# Patient Record
Sex: FEMALE | Race: WHITE | NOT HISPANIC OR LATINO | Employment: FULL TIME | URBAN - METROPOLITAN AREA
[De-identification: names, ages, dates, MRNs, and addresses within clinical notes are randomized per-mention and may not be internally consistent; named-entity substitution may affect disease eponyms.]

---

## 2017-12-18 ENCOUNTER — APPOINTMENT (OUTPATIENT)
Dept: LAB | Facility: HOSPITAL | Age: 37
End: 2017-12-18

## 2018-04-11 ENCOUNTER — OFFICE VISIT CONVERTED (OUTPATIENT)
Dept: FAMILY MEDICINE CLINIC | Age: 38
End: 2018-04-11
Attending: NURSE PRACTITIONER

## 2018-10-10 ENCOUNTER — OFFICE VISIT CONVERTED (OUTPATIENT)
Dept: FAMILY MEDICINE CLINIC | Age: 38
End: 2018-10-10
Attending: NURSE PRACTITIONER

## 2019-11-14 ENCOUNTER — OFFICE VISIT CONVERTED (OUTPATIENT)
Dept: FAMILY MEDICINE CLINIC | Age: 39
End: 2019-11-14
Attending: NURSE PRACTITIONER

## 2020-01-06 ENCOUNTER — HOSPITAL ENCOUNTER (OUTPATIENT)
Dept: OTHER | Facility: HOSPITAL | Age: 40
Discharge: HOME | End: 2020-01-06
Attending: INTERNAL MEDICINE

## 2020-07-24 ENCOUNTER — OFFICE VISIT CONVERTED (OUTPATIENT)
Dept: FAMILY MEDICINE CLINIC | Age: 40
End: 2020-07-24
Attending: NURSE PRACTITIONER

## 2020-07-24 ENCOUNTER — HOSPITAL ENCOUNTER (OUTPATIENT)
Dept: OTHER | Facility: HOSPITAL | Age: 40
Discharge: HOME | End: 2020-07-24
Attending: NURSE PRACTITIONER

## 2020-07-28 LAB — SARS-COV-2 RNA SPEC QL NAA+PROBE: NOT DETECTED

## 2021-05-18 NOTE — PROGRESS NOTES
Kiera Murphy 1980     Office/Outpatient Visit    Visit Date: Wed, Oct 10, 2018 03:58 pm    Provider: Sondra Phillips N.P. (Assistant: Loren Martins MA)    Location: [facility]        Electronically signed by Sondra Phillips N.P. on  10/11/2018 08:24:47 AM                             SUBJECTIVE:        CC:     Mrs. Murphy is a 38 year old White female.  presents today due to complaints of blood in urine X 2 weeks, also is here for review on scans from [place] and [facility]         HPI: Kiera reports history of thyroid nodule. This was shown on US last year. Saw ENT Dr. Mayorga last year and was to have follow up but had trouble scheduling with office so would like to be referred to another ENT. She thinks the nodule may have gotten bigger since last year.    She also reports blood in her urine. Describes as bright red blood. Has had recurrent episodes of blood in urine with previous flank pain and abdominal pain. She was seen at [facility] ER 12/2017. CT scan was normal except for fatty liver. She also had CT scan at [facility] 7/24/18 with advanced hepatic steatosis. No renal abnormalities. Was recommended to follow up with PCP at that time. She notes that she has had blood in her urine again for the past 1-2 weeks intermittently. She notes family history of kidney problems with one having kidney transplant. Also family members recently diagnosed with lupus.             ROS:     CONSTITUTIONAL:  Negative for chills and fever.      CARDIOVASCULAR:  Negative for chest pain and palpitations.      RESPIRATORY:  Negative for dyspnea and frequent wheezing.      GASTROINTESTINAL:  Positive for abdominal pain ( previously, none currently ).   Negative for constipation or diarrhea.      GENITOURINARY:  Positive for hematuria.   Negative for dysuria or frequent urination.      NEUROLOGICAL:  Negative for dizziness and headaches.      ENDOCRINE:  Positive for prediabetes, no longer on medication and  thyroid nodule.      PSYCHIATRIC:  Negative for depression and suicidal thoughts.          PMH/FMH/SH:     Last Reviewed on 10/10/2018 04:21 PM by Sondra Phillips    Past Medical History:                 PAST MEDICAL HISTORY         Pre-diabetes     Migraine Headaches     Rosacea         GYNECOLOGICAL HISTORY:             PREVENTIVE HEALTH MAINTENANCE             MAMMOGRAM: declines, understands reason for testing has never been done Maternal grandmother with breast cancer     PAP SMEAR: was last done  with normal results         PAST MEDICAL HISTORY             CURRENT MEDICAL PROVIDERS:    Dermatologist: Dr. Murray    E/N/T: (Regarding Thyroid nodule 3/17)         Surgical History:         Positive for CT BRAIN , NEG.;     removed foreign object from right  knee;    wisdom teeth surgery;     Cholecystectomy: ;         Family History:         Positive for Coronary Artery Disease and Hypertension.      Positive for Breast Cancer.      Positive for Asthma.      Positive for Renal Failure.      Positive for Type 2 Diabetes and Hypothyroidism.  Father: Hypertension;  Fibromyalgia; Osteoarthritis     Mother: Osteoarthritis     Brother(s): 3 brother(s) total;  Fibromyalgia; Osteoarthritis     Sister(s): Healthy; 1 sister(s) total         Social History:     Occupation: [employer]     Marital Status:      Children: None         Tobacco/Alcohol/Supplements:     Last Reviewed on 10/10/2018 04:21 PM by Sondra Phillips    Tobacco: She has a past history of cigarette smoking; quit date:  .          Alcohol: Frequency: Socially     Caffeine:  She admits to consuming caffeine via coffee ( 2 servings per day ) and tea ( 1 serving per day ).          Substance Abuse History:     Last Reviewed on 10/10/2018 04:21 PM by Sondra Phillips    None         Mental Health History:     Last Reviewed on 10/10/2018 04:21 PM by Sondra Phillips    NEGATIVE         Communicable Diseases (eg STDs):     Last Reviewed on 10/10/2018 04:21  PM by Sondra Phillips    Reportable health conditions; NEGATIVE             Current Problems:     Last Reviewed on 10/10/2018 04:21 PM by Sondra Phillips    Sinus tachycardia     Abnormal glucose, Other     Thyroid nodule     Morbid obesity     Acne rosacea     Obstructive sleep apnea     Allergies     Common migraine         Immunizations:     Adacel (Tdap) 3/29/2016         Allergies:     Last Reviewed on 10/10/2018 04:21 PM by Sondra Phillips    Keflex:        Current Medications:     Last Reviewed on 10/10/2018 04:21 PM by Sondra Phillips    Albuterol HFA 90mcg/1actuation Oral Inhaler 2 puff  QID  PRN     Singulair  10mg Tablet 1 tab daily     Lancet   Lancet Check blood once daily as directed w/Contour Next Diag R73.09     Loratadine 10mg Tablet 1 tab daily     Contour Next Test Strips  Reagent Strips check blood sugar once daily  DX: R73.09     Doxycycline  50mg Capsules Take 1 capsule(s) by mouth bid         OBJECTIVE:        Vitals:         Current: 10/10/2018 4:04:06 PM    Ht:  5 ft, 4 in;  Wt: 461.8 lbs;  BMI: 79.3    T: 98.3 F (oral);  BP: 130/59 mm Hg (left arm, sitting);  P: 119 bpm (finger clip, sitting);  sCr: 0.78 mg/dL;  GFR: 158.62        Exams:     PHYSICAL EXAM:     GENERAL: well developed, well nourished, obese;  no apparent distress;     NECK: range of motion is normal; trachea is midline;     RESPIRATORY: normal respiratory rate and pattern with no distress; normal breath sounds with no rales, rhonchi, wheezes or rubs;     CARDIOVASCULAR: normal rate; rhythm is regular;     GASTROINTESTINAL: nontender; normal bowel sounds; no organomegaly;     NEUROLOGIC: mental status: alert and oriented x 3; GROSSLY INTACT     PSYCHIATRIC: appropriate affect and demeanor;         Lab/Test Results:             Glucose, Urine:  Neg (10/10/2018),     Bilirubin, urine:  Negative (10/10/2018),     Ketones, Urine Strip:  Negative (10/10/2018),     Specific Gravity, urine:  1.030 (10/10/2018),     Blood in Urine:  small  (10/10/2018),     pH, urine:  6.0 (10/10/2018),     Protein Urine QL:  30 mg (10/10/2018),     Urobilinogen, urine:  1.0 E.U./dL (10/10/2018),     Nitrite, Urine:  Negative (10/10/2018),     Leukoctyes, urine:  Negative (10/10/2018),     Appearance:  Clear (10/10/2018),     collection source:  Clean-catch (10/10/2018),     Color:  Yellow (10/10/2018),     Performed by::  AS (10/10/2018),             ASSESSMENT           599.70   R31.9  Hematuria, unspecified              DDx:     571.8   K75.81  Nonalcoholic fatty liver              DDx:     V77.91   Z13.220  Screening for cholesterol level              DDx:     241.0   E07.9  Thyroid nodule              DDx:     719.48   M25.50  Joint pain, other specified site              DDx:     278.01   E66.01  Morbid obesity              DDx:         ORDERS:         Radiology/Test Orders:       [id number]  US, soft tissues of head & neck (eg, thyroid, parathyroid, parotid), real time w/image documentation  (Send-Out)           Lab Orders:       [id number]  BDCB2 - [facility] CBC w/o diff  (Send-Out)         98642  COMP - [facility] Comp. Metabolic Panel  (Send-Out)         30482  LPDP - [facility] Lipid Panel  (Send-Out)         43285  TSH - [facility] TSH  (Send-Out)         85023  RAPII - [facility] Arthritis Profile  (Send-Out)         91704  Urinalysis, automated, without microscopy  (In-House)           Procedures Ordered:       REFER  Referral to Specialist or Other Facility  (Send-Out)           Other Orders:         Calculated BMI above the upper parameter and a follow-up plan was documented in the medical record  (In-House)                   PLAN:          Hematuria, unspecified Urine testing with small amount of blood in urine. CT from [facility] and [facility] reviewed. Referral to urology for further evaluation.         REFERRALS:  Referral initiated to a urologist.      FOLLOW-UP: Schedule follow-up appointments on a p.r.n. basis. for after testing           Orders:       REFER  Referral to Specialist  or Other Facility  (Send-Out)         78137  Urinalysis, automated, without microscopy  (In-House)            Nonalcoholic fatty liver Checking labs today. Discussed low fat diet, avoidance of alcohol. Consider GI referral.     LABORATORY:  Labs ordered to be performed today include CBC W/O DIFF and Comprehensive metabolic panel.      RECOMMENDATIONS given include: Further recommendation to be given after test results are complete.            Orders:       13308  BDCB2 - [facility] CBC w/o diff  (Send-Out)         79929  COMP - [facility] Comp. Metabolic Panel  (Send-Out)            Screening for cholesterol level     LABORATORY:  Labs ordered to be performed today include lipid panel.      RECOMMENDATIONS given include: Further recommendation to be given after test results are complete.            Orders:       [id number]  LPDP [facility] Lipid Panel  (Send-Out)            Thyroid nodule     LABORATORY:  Labs ordered to be performed today include TSH.      RADIOLOGY:  I have ordered Thyroid Ultrasound to be done today.      REFERRALS:  Referral initiated to an E/N/T.            Orders:       96482  TSH - [facility] TSH  (Send-Out)         68159  US, soft tissues of head & neck (eg, thyroid, parathyroid, parotid), real time w/image documentation  (Send-Out)            Joint pain, other specified site     LABORATORY:  Labs ordered to be performed today include Arthritis Profile.      RECOMMENDATIONS given include: Further recommendation to be given after test results are complete.            Orders:       72691  RAPII - [facility] Arthritis Profile  (Send-Out)            Morbid obesity     MIPS     BMI Elevated - Follow-Up Plan: She was provided education on weight loss strategies           Orders:         Calculated BMI above the upper parameter and a follow-up plan was documented in the medical record  (In-House)               Patient Recommendations:        For  Hematuria, unspecified:     Schedule follow-up appointments as needed.          For   Joint pain, other specified site:     I also recommend ^.              CHARGE CAPTURE           **Please note: ICD descriptions below are intended for billing purposes only and may not represent clinical diagnoses**        Primary Diagnosis:         599.70 Hematuria, unspecified            R31.9    Hematuria, unspecified              Orders:          26458   Office/outpatient visit; established patient, level 4  (In-House)             76417   Urinalysis, automated, without microscopy  (In-House)           571.8 Nonalcoholic fatty liver            K75.81    Nonalcoholic steatohepatitis (OLMSTEAD)    V77.91 Screening for cholesterol level            Z13.220    Encounter for screening for lipoid disorders    241.0 Thyroid nodule            E07.9    Disorder of thyroid, unspecified    719.48 Joint pain, other specified site            M25.50    Pain in unspecified joint    278.01 Morbid obesity            E66.01    Morbid (severe) obesity due to excess calories              Orders:             Calculated BMI above the upper parameter and a follow-up plan was documented in the medical record  (In-House)               ADDENDUMS:      ____________________________________    Addendum: 10/12/2018 03:10 PM - Karen Jimenez         Visit Note Faxed to:        Corey Mayorga  (Otolaryngology); Number [phone]            Addendum: 10/12/2018 03:11 PM - Karen Jimenez         Visit Note Faxed to:        Cosme Sesay  (General Surgery); Number [phone]

## 2021-05-18 NOTE — PROGRESS NOTES
Kiera Murphy  1980     Office/Outpatient Visit    Visit Date:  04:15 pm    Provider: Heidi Olivarez N.P. (Assistant: Brenda Blunt LPN)    Location: [facility]        Electronically signed by Heidi Olivarez N.P. on  2020 05:17:05 PM                             Subjective:        CC: Mrs. Murphy is a 39 year old White female.  fever, ear drainage x 2 weeks, cough about 7 days;         HPI:           Patient complains of acute upper respiratory infection, unspecified.  These have been present for the past 7 days.  The symptoms include body aches, productive cough, ear congestion,  fever, headache, nasal congestion and scant, purulent sputum production.  She denies chest congestion, Chills, sinus pain/pressure or wheezing.  She denies exposure to ill contacts.  She has not tried any medications for symptomatic relief.  Medical history is significant for Lupus.  Traveled to [place]     ROS:     CONSTITUTIONAL:  Positive for fatigue and fever ( low grade ).      E/N/T:  Positive for ear pain ( bilateral ).      CARDIOVASCULAR:  Negative for chest pain, orthopnea, paroxysmal nocturnal dyspnea and pedal edema.      RESPIRATORY:  Positive for recent cough ( with scant amounts of purulent sputum ).   Negative for dyspnea.      GASTROINTESTINAL:  Negative for abdominal pain, constipation, diarrhea, nausea and vomiting.      NEUROLOGICAL:  Positive for headaches.      PSYCHIATRIC:  Negative for anxiety, depression, and sleep disturbances.          Past Medical History / Family History / Social History:         Last Reviewed on 2020 04:44 PM by Heidi Olivarez    Past Medical History:                 PAST MEDICAL HISTORY         Pre-diabetes     Migraine Headaches     Rosacea         GYNECOLOGICAL HISTORY:             PREVENTIVE HEALTH MAINTENANCE             MAMMOGRAM: declines, understands reason for testing has never been done Maternal grandmother with  breast cancer     PAP SMEAR: was last done 2017 with normal results [facility]         PAST MEDICAL HISTORY             CURRENT MEDICAL PROVIDERS:    Dermatologist: Dr. Murray (no longer seeing)    E/N/T: thyroid nodule (no longer seeing)    Rheumatologist: Dr. Flores         Surgical History:         Positive for CT BRAIN 2000, NEG.;     removed foreign object from right  knee;    wisdom teeth surgery;     Cholecystectomy: 2014;         Family History:         Positive for Coronary Artery Disease and Hypertension.      Positive for Breast Cancer.      Positive for Asthma.      Positive for Renal Failure.      Positive for Type 2 Diabetes and Hypothyroidism.  Father: Hypertension;  Fibromyalgia; Osteoarthritis     Mother: Osteoarthritis     Brother(s): 3 brother(s) total;  Fibromyalgia; Osteoarthritis     Sister(s): Healthy; 1 sister(s) total         Social History:     Occupation: [employer]     Marital Status:      Children: None         Tobacco/Alcohol/Supplements:     Last Reviewed on 7/24/2020 04:44 PM by Heidi Olivarez    Tobacco: She has a past history of cigarette smoking; quit date:  2008.          Alcohol: Frequency: Socially     Caffeine:  She admits to consuming caffeine via coffee ( 2 servings per day ) and tea ( 1 serving per day ).          Mental Health History:     Last Reviewed on 7/24/2020 04:44 PM by Heidi Olivarez    NEGATIVE         Current Problems:     Last Reviewed on 7/24/2020 04:44 PM by Heidi Olivarez    Allergic rhinitis, unspecified    Morbid (severe) obesity due to excess calories    Disorder of thyroid, unspecified    Other abnormal glucose    Tachycardia, unspecified    Hematuria, unspecified    Encounter for screening for lipoid disorders    Nonalcoholic steatohepatitis (OLMSTEAD)    Pain in unspecified joint    Carpal tunnel syndrome, left upper limb    Ganglion, left wrist        Immunizations:     Adacel (Tdap) 3/29/2016        Allergies:     Last Reviewed on  7/24/2020 04:44 PM by Heidi Olivarez    Keflex:          Current Medications:     Last Reviewed on 7/24/2020 04:44 PM by Heidi Olivarez    Plaquenil 200 mg oral tablet [take 1 tablet (200 mg) by oral route 2 times per day]    doxycycline monohydrate 50 mg oral capsule [Take 1 capsule(s) by mouth bid]    albuterol sulfate 90 mcg/actuation Inhalation HFA Aerosol Inhaler [2 puff  QID  PRN]    Contour Next Test Strips  Reagent Strips [check blood sugar once daily  DX: R73.09]    Lancet   Lancet [Check blood once daily as directed w/Contour Next Diag R73.09]        Objective:        Vitals:         Current: 7/24/2020 4:22:28 PM    Ht:  5 ft, 4 in;  Wt: 443 lbs;  BMI: 76.0T: 98.2 F (oral);  BP: 128/60 mm Hg (left arm, sitting);  P: 80 bpm (left arm (BP Cuff), sitting);  sCr: 0.7 mg/dL;  GFR: 172.03        Exams:     PHYSICAL EXAM:     GENERAL: vital signs recorded - morbidly obese;  no apparent distress;     E/N/T:  normal EACs, TMs, nasal/oral mucosa, teeth, gingiva, and oropharynx;     RESPIRATORY: normal respiratory rate and pattern with no distress; normal breath sounds with no rales, rhonchi, wheezes or rubs;     CARDIOVASCULAR: normal rate; rhythm is regular;  no systolic murmur; no edema;     GASTROINTESTINAL: nontender; normal bowel sounds; no organomegaly;     NEUROLOGIC: GROSSLY INTACT     PSYCHIATRIC:  appropriate affect and demeanor; normal speech pattern; grossly normal memory;         Assessment:         J06.9   Acute upper respiratory infection, unspecified           ORDERS:         Radiology/Test Orders:       [id number]  COVID 19 Testing [facility]  (Send-Out)                      Plan:         Acute upper respiratory infection, unspecifiedHome quarantine until results back, dmt         RECOMMENDATIONS given include: Further recommendation to be given after test results are complete.      FOLLOW-UP: Advised to call if there is no improvement Follow-up by phone if no improvement in 1 week..  :Schedule  follow-up appointments on a p.r.n. basis.:.            Orders:       [id number]  COVID 19 Testing [facility]  (Send-Out)                  Patient Recommendations:        For  Acute upper respiratory infection, unspecified:    Follow-up by phone if no improvement in 1 week. Schedule follow-up appointments as needed.                APPOINTMENT INFORMATION:        Monday Tuesday Wednesday Thursday Friday Saturday Sunday            Time:___________________AM  PM   Date:_____________________             Charge Capture:         Primary Diagnosis:     J06.9  Acute upper respiratory infection, unspecified           Orders:      [id number]  Office/outpatient visit; established patient, level 3  (In-House)

## 2021-05-18 NOTE — PROGRESS NOTES
Kiera Murphy J  1980     Office/Outpatient Visit    Visit Date:  02:06 pm    Provider: Sondra Phillips N.P. (Assistant: Kacy Donahue MA)    Location: [facility]        Electronically signed by Sondra Phillips N.P. on  2019 02:14:26 PM                             Subjective:        CC: Mrs. Murphy is a 39 year old White female.  knot on left wrist, numbness also;         HPI: Kiera presents with c/o knot on left wrist. First noticed a couple of weeks ago. She was previously having pain and this has worsened. Numbness in last two fingers. She has history of carpal tunnel. Saw [facility] about 6 years ago. Had injections and wearing braces. They wanted to do surgery but she was trying to hold off on this. She does do frequent typing. Cannot take steroids. Her rheumatologist is Dr. Flores and she does her lab work.    ROS:     CONSTITUTIONAL:  Negative for chills and fever.      CARDIOVASCULAR:  Negative for chest pain and palpitations.      RESPIRATORY:  Negative for dyspnea and frequent wheezing.      MUSCULOSKELETAL:  Positive for left hand/wrist pain.      INTEGUMENTARY/BREAST:  Positive for knot to left wrist.      NEUROLOGICAL:  Positive for paresthesias.      ENDOCRINE:  Negative for polydipsia and polyphagia.          Past Medical History / Family History / Social History:         Last Reviewed on 2019 03:13 PM by Sondra Phillips    Past Medical History:                 PAST MEDICAL HISTORY         Pre-diabetes     Migraine Headaches     Rosacea         GYNECOLOGICAL HISTORY:             PREVENTIVE HEALTH MAINTENANCE             MAMMOGRAM: declines, understands reason for testing has never been done Maternal grandmother with breast cancer     PAP SMEAR: was last done 2017 with normal results [facility]         PAST MEDICAL HISTORY             CURRENT MEDICAL PROVIDERS:    Dermatologist: Dr. Murray (no longer seeing)    E/N/T: thyroid  nodule (no longer seeing)    Rheumatologist: Dr. Flores         Surgical History:         Positive for CT BRAIN 2000, NEG.;     removed foreign object from right  knee;    wisdom teeth surgery;     Cholecystectomy: 2014;         Family History:         Positive for Coronary Artery Disease and Hypertension.      Positive for Breast Cancer.      Positive for Asthma.      Positive for Renal Failure.      Positive for Type 2 Diabetes and Hypothyroidism.  Father: Hypertension;  Fibromyalgia; Osteoarthritis     Mother: Osteoarthritis     Brother(s): 3 brother(s) total;  Fibromyalgia; Osteoarthritis     Sister(s): Healthy; 1 sister(s) total         Social History:     Occupation: [employer]     Marital Status:      Children: None         Tobacco/Alcohol/Supplements:     Last Reviewed on 11/14/2019 02:06 PM by Kacy Donahue    Tobacco: She has a past history of cigarette smoking; quit date:  2008.          Alcohol: Frequency: Socially     Caffeine:  She admits to consuming caffeine via coffee ( 2 servings per day ) and tea ( 1 serving per day ).          Substance Abuse History:     Last Reviewed on 10/10/2018 04:21 PM by Sondra Phillips    None         Mental Health History:     Last Reviewed on 10/10/2018 04:21 PM by Sondra Phillips    NEGATIVE         Communicable Diseases (eg STDs):     Last Reviewed on 10/10/2018 04:21 PM by Sondra Phillips    Reportable health conditions; NEGATIVE         Current Problems:     Last Reviewed on 10/10/2018 04:21 PM by Sondra Phillisp    Common migraine    Allergic rhinitis, unspecified    Allergies    Obstructive sleep apnea    Acne rosacea    Morbid obesity    Morbid (severe) obesity due to excess calories    Disorder of thyroid, unspecified    Thyroid nodule    Other abnormal glucose    Abnormal glucose, Other    Tachycardia, unspecified    Sinus tachycardia    Screening for cholesterol level    Nonalcoholic fatty liver    Hematuria, unspecified    Encounter for screening for lipoid disorders     Nonalcoholic steatohepatitis (OLMSTEAD)    Pain in unspecified joint    Hematuria, unspecified     Joint pain, other specified site    Carpal tunnel syndrome, left upper limb        Immunizations:     Adacel (Tdap) 3/29/2016        Allergies:     Last Reviewed on 11/14/2019 02:06 PM by Kacy Donahue    Keflex:          Current Medications:     Last Reviewed on 11/14/2019 02:06 PM by Kacy Donahue    doxycycline monohydrate 50 mg oral capsule [Take 1 capsule(s) by mouth bid]    Loratadine 10mg Tablet [1 tab daily]    Singulair  10mg Tablet [1 tab daily]    albuterol sulfate 90 mcg/actuation Inhalation HFA Aerosol Inhaler [2 puff  QID  PRN]    Contour Next Test Strips  Reagent Strips [check blood sugar once daily  DX: R73.09]    Lancet   Lancet [Check blood once daily as directed w/Contour Next Diag R73.09]    Plaquenil 200 mg oral tablet [take 1 tablet (200 mg) by oral route 2 times per day]        Objective:        Vitals:         Current: 11/14/2019 2:12:56 PM    Ht:  5 ft, 4 in;  Wt: 454.4 lbs;  BMI: 78.0T: 98.2 F (oral);  BP: 139/69 mm Hg (right arm, sitting);  P: 91 bpm (right arm (BP Cuff), sitting);  sCr: 0.7 mg/dL;  GFR: 173.89        Exams:     PHYSICAL EXAM:     GENERAL: well developed, well nourished;  no apparent distress;     RESPIRATORY: normal respiratory rate and pattern with no distress; normal breath sounds with no rales, rhonchi, wheezes or rubs;     CARDIOVASCULAR: normal rate; rhythm is regular;     BREAST/INTEGUMENT: SKIN: no significant rashes or lesions; no suspicious moles; palpable nodule to left wrist, mobile;     MUSCULOSKELETAL: normal gait; normal range of motion of all major muscle groups; no limb or joint pain with range of motion; +Phalens test;     NEUROLOGIC: mental status: alert and oriented x 3; GROSSLY INTACT     PSYCHIATRIC: appropriate affect and demeanor;         Assessment:         G56.02   Carpal tunnel syndrome, left upper limb       M67.432   Ganglion, left wrist            ORDERS:         Meds Prescribed:       [New Rx] meloxicam 15 mg oral tablet [take 1 tablet (15 mg) by oral route once daily], #30 (thirty) tablets, Refills: 0 (zero)         Procedures Ordered:       REFER  Referral to Specialist or Other Facility  (Send-Out)                      Plan:         Carpal tunnel syndrome, left upper limb        REFERRALS:  Referral initiated to Hand specialist - [facility].      RECOMMENDATIONS given include: Braces. Stretches while working..  MIPS Vaccines Flu and Pneumonia updated in Shot record     FOLLOW-UP: for Annual Checkup           Orders:       REFER  Referral to Specialist or Other Facility  (Send-Out)              Ganglion, left wristAs above          Prescriptions:       [New Rx] meloxicam 15 mg oral tablet [take 1 tablet (15 mg) by oral route once daily], #30 (thirty) tablets, Refills: 0 (zero)             Patient Recommendations:        For  Carpal tunnel syndrome, left upper limb:    I also recommend Hand specialist - [facility].              Charge Capture:         Primary Diagnosis:     G56.02  Carpal tunnel syndrome, left upper limb           Orders:      00310  Office/outpatient visit; established patient, level 3  (In-House)              M67.432  Ganglion, left wrist

## 2021-05-18 NOTE — PROGRESS NOTES
Kiera Murphy 1980     Office/Outpatient Visit    Visit Date:  02:18 pm    Provider: Sondra Phillips N.P. (Assistant: Marine Tyson RN)    Location: [facility], [place]        Electronically signed by Sondra Phillips N.P. on  2018 09:00:17 PM                             SUBJECTIVE:        CC: Cough         HPI:         Upper respiratory illness noted.  These have been present for the past 6 days.  The symptoms include chest congestion, cough and swollen glands.  She has already tried to relieve the symptoms with Loratadine, Singulair, Albuterol inhaler, Tussionex.      ROS:     CONSTITUTIONAL:  Negative for chills and fever.      EYES:  Negative for eye drainage and eye pain.      E/N/T:  Positive for sore throat ( from coughing ).   Negative for ear pain.      CARDIOVASCULAR:  Negative for chest pain and palpitations.      RESPIRATORY:  Positive for recent cough and dyspnea.      GASTROINTESTINAL:  Negative for abdominal pain and vomiting.          [facility]/:     Last Reviewed on 3/15/2017 01:43 PM by Karen Ott    Past Medical History:                 PAST MEDICAL HISTORY         Migraine Headaches     Rosacea         GYNECOLOGICAL HISTORY:             PREVENTIVE HEALTH MAINTENANCE             COLONOSCOPY: has never been done and has no medical need for one at this time     MAMMOGRAM: has never been done Maternal grandmother with breast cancer     PAP SMEAR: was last done  with normal results     INFLUENZA VACCINE: declines, understands reason for immunization         PAST MEDICAL HISTORY             CURRENT MEDICAL PROVIDERS:    E/N/T: (Regarding Thyroid nodule 3/17)         Surgical History:         Positive for CT BRAIN , NEG.;     removed foreign object from right  knee;    wisdom teeth surgery;     Cholecystectomy: ;         Family History:         Positive for Coronary Artery Disease and Hypertension.      Positive for Breast Cancer.      Positive  for Asthma.      Positive for Renal Failure.      Positive for Type 2 Diabetes and Hypothyroidism.  Father: Hypertension;  Fibromyalgia; Osteoarthritis     Mother: Osteoarthritis     Brother(s): 3 brother(s) total;  Fibromyalgia; Osteoarthritis     Sister(s): Healthy; 1 sister(s) total         Social History:     Occupation: [employer]     Marital Status:      Children: None         Tobacco/Alcohol/Supplements:     Last Reviewed on 9/22/2017 09:12 AM by Janie Chapa    Tobacco: She has a past history of cigarette smoking; quit date:  2008.          Alcohol: Frequency: Socially     Caffeine:  She admits to consuming caffeine via coffee ( 2 servings per day ) and tea ( 1 serving per day ).          Substance Abuse History:     Last Reviewed on 3/15/2017 01:43 PM by Karen Ott    None         Mental Health History:     Last Reviewed on 3/15/2017 01:43 PM by Karen Ott    NEGATIVE         Communicable Diseases (eg STDs):     Last Reviewed on 3/15/2017 01:43 PM by Karen Ott    Reportable health conditions; NEGATIVE             Current Problems:     Last Reviewed on 3/15/2017 01:43 PM by Karen Ott    Sinus tachycardia     Abnormal glucose, Other     Thyroid nodule     Morbid obesity     Acne rosacea     Obstructive sleep apnea     Allergies     Common migraine         Immunizations:     Adacel (Tdap) 3/29/2016         Allergies:     Last Reviewed on 4/11/2018 02:21 PM by Marine Tyson      No Known Drug Allergies.         Current Medications:     Last Reviewed on 4/11/2018 02:21 PM by Marine Tyson    Lancet   Lancet Check blood once daily as directed w/Contour Next Diag R73.09     Contour Next Test Strips  Reagent Strips check blood sugar once daily  DX: R73.09     Albuterol HFA 90mcg/1actuation Oral Inhaler 2 puff  QID  PRN     Metformin HCl 500mg Tablet 1 tab bid     Loratadine 10mg Tablet 1 tab daily     Singulair  10mg Tablet 1 tab daily         OBJECTIVE:        Vitals:          Current: 4/11/2018 2:20:09 PM    Ht:  5 ft, 4 in;  Wt: 467 lbs;  BMI: 80.2    T: 97 F (oral);  BP: 120/86 mm Hg (left arm, sitting);  P: 107 bpm (finger clip, sitting);  sCr: 0.78 mg/dL;  GFR: 160.88    O2 Sat: 98 % (room air)        Exams:     PHYSICAL EXAM:     GENERAL: well developed, well nourished;  no apparent distress;     EYES: PERRL, EOMI     E/N/T: EARS: external auditory canal normal;  both TMs are dull;  NOSE: normal turbinates; no sinus tenderness; OROPHARYNX: oral mucosa is normal; posterior pharynx shows no exudate and post nasal drip;     NECK: range of motion is normal; trachea is midline;     RESPIRATORY: normal respiratory rate and pattern with no distress; normal breath sounds with no rales, rhonchi, wheezes or rubs;     CARDIOVASCULAR: normal rate; rhythm is regular;     MUSCULOSKELETAL: normal gait;     NEUROLOGIC: mental status: alert and oriented x 3; GROSSLY INTACT     PSYCHIATRIC: appropriate affect and demeanor;         ASSESSMENT           466.0   J20.9  Acute bronchitis              DDx:     477.0   J30.9  Allergies              DDx:         ORDERS:         Meds Prescribed:       Prednisone 10mg Tablet take 6 pills today, 5 pills tomorrow, 4 pills day 3, 3 pills day 4, 2 pills day 5 and 1 pill day 6  #21 (Twenty One) tablet(s) Refills: 0       Refill of: Albuterol HFA  2 puff  QID  PRN  #1 (One) 18 gm inhaler Refills: 0       Refill of: Singulair  (Montelukast Sodium) 10mg Tablet 1 tab daily  #30 (Thirty) tablet(s) Refills: 5       Tessalon Perles (Benzonatate) 100mg Capsules One PO Q 8 hours PRN cough  #30 (Thirty) capsule(s) Refills: 0         Other Orders:       37177  Noninvasive ear or pulse oximetry for oxygen saturation; single determination  (In-House)                   PLAN:          Acute bronchitis         RECOMMENDATIONS given include: Push Fluids, Rest, Follow up if no improvement or worsening symptoms like high fevers, vomiting, weakness, or increasing shortness of  air.    .      FOLLOW-UP: Schedule follow-up appointments on a p.r.n. basis. Chronic visit follow up           Prescriptions: Advised that cough medication may cause drowsiness.       Prednisone 10mg Tablet take 6 pills today, 5 pills tomorrow, 4 pills day 3, 3 pills day 4, 2 pills day 5 and 1 pill day 6  #21 (Twenty One) tablet(s) Refills: 0       Tessalon Perles (Benzonatate) 100mg Capsules One PO Q 8 hours PRN cough  #30 (Thirty) capsule(s) Refills: 0           Orders:       35966  Noninvasive ear or pulse oximetry for oxygen saturation; single determination  (In-House)             Patient Education Handouts:       Acute Bronchitis           Allergies         RECOMMENDATIONS given include: Avoid allergens, Consider allergy testing.            Prescriptions:       Refill of: Albuterol HFA  2 puff  QID  PRN  #1 (One) 18 gm inhaler Refills: 0       Refill of: Singulair  (Montelukast Sodium) 10mg Tablet 1 tab daily  #30 (Thirty) tablet(s) Refills: 5             Patient Recommendations:        For  Acute bronchitis:     Schedule follow-up appointments as needed.              CHARGE CAPTURE           **Please note: ICD descriptions below are intended for billing purposes only and may not represent clinical diagnoses**        Primary Diagnosis:         466.0 Acute bronchitis            J20.9    Acute bronchitis, unspecified              Orders:          [id number]   Office/outpatient visit; established patient, level 3  (In-House)             01293   Noninvasive ear or pulse oximetry for oxygen saturation; single determination  (In-House)           477.0 Allergies            J30.9    Allergic rhinitis, unspecified

## 2021-07-01 VITALS
HEIGHT: 64 IN | TEMPERATURE: 98.3 F | SYSTOLIC BLOOD PRESSURE: 130 MMHG | WEIGHT: 293 LBS | BODY MASS INDEX: 50.02 KG/M2 | DIASTOLIC BLOOD PRESSURE: 59 MMHG | HEART RATE: 119 BPM

## 2021-07-01 VITALS
BODY MASS INDEX: 50.02 KG/M2 | HEART RATE: 91 BPM | SYSTOLIC BLOOD PRESSURE: 139 MMHG | HEIGHT: 64 IN | TEMPERATURE: 98.2 F | WEIGHT: 293 LBS | DIASTOLIC BLOOD PRESSURE: 69 MMHG

## 2021-07-01 VITALS
HEIGHT: 64 IN | DIASTOLIC BLOOD PRESSURE: 86 MMHG | WEIGHT: 293 LBS | SYSTOLIC BLOOD PRESSURE: 120 MMHG | BODY MASS INDEX: 50.02 KG/M2 | HEART RATE: 107 BPM | OXYGEN SATURATION: 98 % | TEMPERATURE: 97 F

## 2021-07-02 VITALS
WEIGHT: 293 LBS | BODY MASS INDEX: 50.02 KG/M2 | DIASTOLIC BLOOD PRESSURE: 60 MMHG | HEART RATE: 80 BPM | TEMPERATURE: 98.2 F | HEIGHT: 64 IN | SYSTOLIC BLOOD PRESSURE: 128 MMHG